# Patient Record
Sex: MALE | Race: WHITE
[De-identification: names, ages, dates, MRNs, and addresses within clinical notes are randomized per-mention and may not be internally consistent; named-entity substitution may affect disease eponyms.]

---

## 2017-05-12 PROBLEM — Z00.00 ENCOUNTER FOR PREVENTIVE HEALTH EXAMINATION: Status: ACTIVE | Noted: 2017-05-12

## 2017-06-19 PROBLEM — Z85.51 HISTORY OF CARCINOMA OF BLADDER: Status: RESOLVED | Noted: 2017-06-19 | Resolved: 2017-06-19

## 2017-06-19 PROBLEM — C68.9 UROTHELIAL CANCER: Status: RESOLVED | Noted: 2017-06-19 | Resolved: 2017-06-19

## 2017-06-19 PROBLEM — I10 HTN (HYPERTENSION), BENIGN: Status: RESOLVED | Noted: 2017-06-19 | Resolved: 2017-06-19

## 2017-06-19 PROBLEM — I25.10 CAD (CORONARY ARTERY DISEASE): Status: RESOLVED | Noted: 2017-06-19 | Resolved: 2017-06-19

## 2017-06-19 PROBLEM — Z87.891 FORMER SMOKER: Status: ACTIVE | Noted: 2017-06-19

## 2017-06-19 PROBLEM — Z87.09 HISTORY OF PULMONARY EMPHYSEMA: Status: RESOLVED | Noted: 2017-06-19 | Resolved: 2017-06-19

## 2017-06-19 RX ORDER — APIXABAN 2.5 MG/1
2.5 TABLET, FILM COATED ORAL
Refills: 0 | Status: ACTIVE | COMMUNITY

## 2017-06-19 RX ORDER — FLUTICASONE PROPIONATE AND SALMETEROL 50; 250 UG/1; UG/1
250-50 POWDER RESPIRATORY (INHALATION)
Refills: 0 | Status: ACTIVE | COMMUNITY

## 2017-06-19 RX ORDER — ATORVASTATIN CALCIUM 40 MG/1
40 TABLET, FILM COATED ORAL
Refills: 0 | Status: ACTIVE | COMMUNITY

## 2017-06-19 RX ORDER — TIOTROPIUM BROMIDE 18 UG/1
18 CAPSULE ORAL; RESPIRATORY (INHALATION)
Refills: 0 | Status: ACTIVE | COMMUNITY

## 2017-06-20 ENCOUNTER — APPOINTMENT (OUTPATIENT)
Dept: THORACIC SURGERY | Facility: CLINIC | Age: 82
End: 2017-06-20

## 2017-06-20 VITALS
HEIGHT: 64 IN | DIASTOLIC BLOOD PRESSURE: 65 MMHG | BODY MASS INDEX: 24.92 KG/M2 | TEMPERATURE: 97.5 F | HEART RATE: 83 BPM | WEIGHT: 146 LBS | OXYGEN SATURATION: 98 % | RESPIRATION RATE: 17 BRPM | SYSTOLIC BLOOD PRESSURE: 109 MMHG

## 2017-06-20 DIAGNOSIS — Z87.891 PERSONAL HISTORY OF NICOTINE DEPENDENCE: ICD-10-CM

## 2017-06-20 DIAGNOSIS — I25.10 ATHEROSCLEROTIC HEART DISEASE OF NATIVE CORONARY ARTERY W/OUT ANGINA PECTORIS: ICD-10-CM

## 2017-06-20 DIAGNOSIS — Z87.09 PERSONAL HISTORY OF OTHER DISEASES OF THE RESPIRATORY SYSTEM: ICD-10-CM

## 2017-06-20 DIAGNOSIS — Z85.51 PERSONAL HISTORY OF MALIGNANT NEOPLASM OF BLADDER: ICD-10-CM

## 2017-06-20 DIAGNOSIS — I10 ESSENTIAL (PRIMARY) HYPERTENSION: ICD-10-CM

## 2017-06-20 DIAGNOSIS — Z78.9 OTHER SPECIFIED HEALTH STATUS: ICD-10-CM

## 2017-06-20 DIAGNOSIS — C68.9 MALIGNANT NEOPLASM OF URINARY ORGAN, UNSPECIFIED: ICD-10-CM

## 2017-06-20 RX ORDER — RAMIPRIL 2.5 MG/1
2.5 CAPSULE ORAL
Refills: 0 | Status: DISCONTINUED | COMMUNITY
End: 2017-06-20

## 2017-06-20 RX ORDER — CLOPIDOGREL 75 MG/1
TABLET, FILM COATED ORAL
Refills: 0 | Status: DISCONTINUED | COMMUNITY
End: 2017-06-20

## 2017-06-20 RX ORDER — MULTIVITAMIN
TABLET ORAL
Refills: 0 | Status: ACTIVE | COMMUNITY

## 2017-06-20 RX ORDER — ASPIRIN 81 MG
81 TABLET, DELAYED RELEASE (ENTERIC COATED) ORAL
Refills: 0 | Status: ACTIVE | COMMUNITY

## 2017-12-19 ENCOUNTER — APPOINTMENT (OUTPATIENT)
Dept: THORACIC SURGERY | Facility: CLINIC | Age: 82
End: 2017-12-19
Payer: MEDICARE

## 2017-12-19 VITALS
BODY MASS INDEX: 25.23 KG/M2 | OXYGEN SATURATION: 96 % | WEIGHT: 147 LBS | HEART RATE: 74 BPM | TEMPERATURE: 98.4 F | DIASTOLIC BLOOD PRESSURE: 68 MMHG | RESPIRATION RATE: 18 BRPM | SYSTOLIC BLOOD PRESSURE: 127 MMHG

## 2017-12-19 PROCEDURE — 99215 OFFICE O/P EST HI 40 MIN: CPT

## 2017-12-20 RX ORDER — ACYCLOVIR 800 MG/1
TABLET ORAL
Refills: 0 | Status: COMPLETED | COMMUNITY
End: 2017-12-20

## 2017-12-20 RX ORDER — FINASTERIDE 5 MG/1
5 TABLET, FILM COATED ORAL
Refills: 0 | Status: COMPLETED | COMMUNITY
End: 2017-12-20

## 2017-12-20 RX ORDER — MUPIROCIN 20 MG/G
2 OINTMENT TOPICAL
Refills: 0 | Status: COMPLETED | COMMUNITY
End: 2017-12-20

## 2018-07-28 PROBLEM — Z78.9 ALCOHOL USE: Status: ACTIVE | Noted: 2017-06-19

## 2018-12-17 ENCOUNTER — OTHER (OUTPATIENT)
Age: 83
End: 2018-12-17

## 2018-12-18 ENCOUNTER — APPOINTMENT (OUTPATIENT)
Dept: THORACIC SURGERY | Facility: CLINIC | Age: 83
End: 2018-12-18
Payer: MEDICARE

## 2019-01-15 ENCOUNTER — APPOINTMENT (OUTPATIENT)
Dept: THORACIC SURGERY | Facility: CLINIC | Age: 84
End: 2019-01-15
Payer: MEDICARE

## 2019-01-15 VITALS
BODY MASS INDEX: 25.23 KG/M2 | TEMPERATURE: 97.4 F | DIASTOLIC BLOOD PRESSURE: 59 MMHG | WEIGHT: 147 LBS | OXYGEN SATURATION: 99 % | SYSTOLIC BLOOD PRESSURE: 102 MMHG | RESPIRATION RATE: 17 BRPM | HEART RATE: 82 BPM

## 2019-01-15 PROCEDURE — 99213 OFFICE O/P EST LOW 20 MIN: CPT

## 2019-01-15 RX ORDER — SITAGLIPTIN 50 MG/1
50 TABLET, FILM COATED ORAL
Refills: 0 | Status: ACTIVE | COMMUNITY

## 2019-01-15 RX ORDER — OMEPRAZOLE 20 MG/1
20 CAPSULE, DELAYED RELEASE ORAL
Refills: 0 | Status: ACTIVE | COMMUNITY

## 2019-01-15 RX ORDER — LEVALBUTEROL TARTRATE 45 UG/1
45 AEROSOL, METERED ORAL
Refills: 0 | Status: ACTIVE | COMMUNITY

## 2019-03-26 ENCOUNTER — APPOINTMENT (OUTPATIENT)
Dept: THORACIC SURGERY | Facility: CLINIC | Age: 84
End: 2019-03-26
Payer: MEDICARE

## 2019-03-26 VITALS
HEART RATE: 82 BPM | WEIGHT: 145 LBS | RESPIRATION RATE: 18 BRPM | BODY MASS INDEX: 24.89 KG/M2 | OXYGEN SATURATION: 97 % | DIASTOLIC BLOOD PRESSURE: 65 MMHG | TEMPERATURE: 98 F | SYSTOLIC BLOOD PRESSURE: 133 MMHG

## 2019-03-26 PROCEDURE — 99214 OFFICE O/P EST MOD 30 MIN: CPT

## 2019-03-28 NOTE — CONSULT LETTER
[Dear  ___] : Dear  [unfilled], [Consult Letter:] : I had the pleasure of evaluating your patient, [unfilled]. [( Thank you for referring [unfilled] for consultation for _____ )] : Thank you for referring [unfilled] for consultation for [unfilled] [Please see my note below.] : Please see my note below. [Consult Closing:] : Thank you very much for allowing me to participate in the care of this patient.  If you have any questions, please do not hesitate to contact me. [Sincerely,] : Sincerely, [FreeTextEntry2] : Lissette Guzman MD (Pul)  [FreeTextEntry3] : Demetrio Okeefe MD, MPH \par System Director of Thoracic Surgery \par Director of Comprehensive Lung and Foregut [place] \par Professor Cardiovascular & Thoracic Surgery  \par [facility] [employer] at [facility]\par

## 2019-03-28 NOTE — PHYSICAL EXAM
[Auscultation Breath Sounds / Voice Sounds] : lungs were clear to auscultation bilaterally [Heart Rate And Rhythm] : heart rate was normal and rhythm regular [Heart Sounds] : normal S1 and S2 [Heart Sounds Gallop] : no gallops [Murmurs] : no murmurs [Heart Sounds Pericardial Friction Rub] : no pericardial rub [Examination Of The Chest] : the chest was normal in appearance [Chest Visual Inspection Thoracic Asymmetry] : no chest asymmetry [Diminished Respiratory Excursion] : normal chest expansion [Bowel Sounds] : normal bowel sounds [Abdomen Soft] : soft [Abdomen Tenderness] : non-tender [Abdomen Mass (___ Cm)] : no abdominal mass palpated [Abnormal Walk] : normal gait [Nail Clubbing] : no clubbing  or cyanosis of the fingernails [Musculoskeletal - Swelling] : no joint swelling seen [Motor Tone] : muscle strength and tone were normal [Skin Color & Pigmentation] : normal skin color and pigmentation [Skin Turgor] : normal skin turgor [] : no rash [Deep Tendon Reflexes (DTR)] : deep tendon reflexes were 2+ and symmetric [Sensation] : the sensory exam was normal to light touch and pinprick [No Focal Deficits] : no focal deficits [Oriented To Time, Place, And Person] : oriented to person, place, and time [Impaired Insight] : insight and judgment were intact [Affect] : the affect was normal

## 2019-03-28 NOTE — ASSESSMENT
[FreeTextEntry1] : 86 y/o M, former smoker, w/ hx of HTN, CAD s/p CABG (2007), COPD/emphysema, partial gastrectomy 1991, bladder CA s/p rxn, Lung CA, and renal cell cancer.\par \par Now ~10yr 9mo s/p Lt VATS LLLobectomy on 6/3/08. 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 \par Also ~10.5 yrs s/p Lt VATS pleurodesis on 8/6/08.\par \par CT Chest on 3/11/19:\par - slightly increasing 8 mm NETTIE solid nodule (series 4 image 27, previously 7 mm), unclear if mucoid impaction or new small primary lung cancer\par - post-op changes\par \par I have reviewed the patient's medical records and diagnostic images at time of this office consultation and have made the following recommendation:\par 1. CT scan reviewed and explained to patient and his daughter, also discussed w/ Dr. MARIN over the phone, the increasing size NETTIE nodule is more nodular but more likely benign, I recommended a close f/u with CT Chest w/out contrast in 3 months\par 2. Patient will also f/u with Dr. MARIN on 3/28/19\par \par \par Written by Vanessa Ortiz NP, acting as a scribe for Dr. Demetrio Okeefe.\par \par The documentation recorded by the scribe accurately reflects the service I personally performed and the decisions made by me. DEMETRIO OKEEFE MD\par

## 2019-03-28 NOTE — HISTORY OF PRESENT ILLNESS
[FreeTextEntry1] : 86 y/o M, former smoker, w/ hx of HTN, CAD s/p CABG (2007), COPD/emphysema, partial gastrectomy 1991, bladder CA s/p rxn, Lung CA, and renal cell cancer.\par \par Now ~10yr 9mo s/p Lt VATS LLLobectomy on 6/3/08. 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 \par Also ~10.5 yrs s/p Lt VATS pleurodesis on 8/6/08.\par \par Pt developed Rt renal CA, s/p Nephrouretectomy on 4/28/16. Path revealed high-grade invasive urothelial CA, 4.5 cm arising form renal pelvics and focally invading into peripelvic fat and into the renal parenchyma.\par \par CT Chest on 12/20/18:\par - minimally enlarging 7 mm NETTIE nodule, previously 2 - 3mm (image 21 series 4).\par - dense calcifications in the Lt hemithorax\par - peripheral reticulations are seen in the lower lobes bilaterally Lt > Rt\par - cardiomegaly with moderate atherosclerotic disease\par \par CT Chest on 3/11/19:\par - slightly increasing 8 mm NETTIE solid nodule (series 4 image 27, previously 7 mm), unclear if mucoid impaction or new small primary lung cancer\par - post-op changes\par \par Pt is here today for follow up. Admits to SOB on exertion, productive cough w/ small amount of whitish sputum, had a flu/cold 3 months ago.

## 2019-06-25 ENCOUNTER — APPOINTMENT (OUTPATIENT)
Dept: THORACIC SURGERY | Facility: CLINIC | Age: 84
End: 2019-06-25
Payer: MEDICARE

## 2019-06-25 VITALS
OXYGEN SATURATION: 96 % | SYSTOLIC BLOOD PRESSURE: 125 MMHG | HEART RATE: 76 BPM | TEMPERATURE: 97.5 F | DIASTOLIC BLOOD PRESSURE: 74 MMHG | RESPIRATION RATE: 18 BRPM | WEIGHT: 138 LBS | BODY MASS INDEX: 23.69 KG/M2

## 2019-06-25 DIAGNOSIS — J18.9 PNEUMONIA, UNSPECIFIED ORGANISM: ICD-10-CM

## 2019-06-25 PROCEDURE — 99213 OFFICE O/P EST LOW 20 MIN: CPT

## 2019-06-26 RX ORDER — ISOPROPYL ALCOHOL 70 ML/100ML
70 SWAB TOPICAL
Qty: 50 | Refills: 0 | Status: ACTIVE | COMMUNITY
Start: 2019-04-20

## 2019-06-26 RX ORDER — AMOXICILLIN AND CLAVULANATE POTASSIUM 500; 125 MG/1; MG/1
500-125 TABLET, FILM COATED ORAL
Qty: 14 | Refills: 0 | Status: COMPLETED | COMMUNITY
Start: 2019-01-15 | End: 2019-06-26

## 2019-06-26 RX ORDER — METOPROLOL SUCCINATE 50 MG/1
50 TABLET, EXTENDED RELEASE ORAL
Refills: 0 | Status: COMPLETED | COMMUNITY
End: 2019-06-26

## 2019-06-26 RX ORDER — DOXEPIN HYDROCHLORIDE 50 MG/1
50 CAPSULE ORAL
Qty: 30 | Refills: 0 | Status: ACTIVE | COMMUNITY
Start: 2019-06-20

## 2019-06-26 RX ORDER — DOXEPIN HYDROCHLORIDE 10 MG/1
10 CAPSULE ORAL
Refills: 0 | Status: COMPLETED | COMMUNITY
End: 2019-06-26

## 2019-06-26 RX ORDER — RANITIDINE HYDROCHLORIDE 150 MG/1
150 CAPSULE ORAL
Refills: 0 | Status: COMPLETED | COMMUNITY
End: 2019-06-26

## 2019-06-26 RX ORDER — METOPROLOL SUCCINATE 100 MG/1
100 TABLET, EXTENDED RELEASE ORAL
Qty: 30 | Refills: 0 | Status: ACTIVE | COMMUNITY
Start: 2019-05-20

## 2019-06-26 RX ORDER — DIGOXIN 125 UG/1
125 TABLET ORAL
Qty: 30 | Refills: 0 | Status: ACTIVE | COMMUNITY
Start: 2019-06-04

## 2019-06-26 RX ORDER — LOSARTAN POTASSIUM AND HYDROCHLOROTHIAZIDE 12.5; 5 MG/1; MG/1
50-12.5 TABLET ORAL
Qty: 30 | Refills: 0 | Status: ACTIVE | COMMUNITY
Start: 2019-05-06

## 2019-06-26 RX ORDER — LOSARTAN POTASSIUM 25 MG/1
25 TABLET, FILM COATED ORAL
Refills: 0 | Status: COMPLETED | COMMUNITY
End: 2019-06-26

## 2019-06-26 RX ORDER — TAMSULOSIN HYDROCHLORIDE 0.4 MG/1
0.4 CAPSULE ORAL
Qty: 30 | Refills: 0 | Status: ACTIVE | COMMUNITY
Start: 2019-05-20

## 2019-06-26 NOTE — REVIEW OF SYSTEMS
[As Noted in HPI] : as noted in HPI [Cough] : cough [Negative] : Heme/Lymph [Wheezing] : no wheezing [SOB on Exertion] : no shortness of breath during exertion

## 2019-06-26 NOTE — HISTORY OF PRESENT ILLNESS
[FreeTextEntry1] : 86 y/o M, former smoker, w/ hx of HTN, CAD s/p CABG (2007), COPD/emphysema, partial gastrectomy 1991, bladder CA s/p rxn, Lung CA, and renal cell cancer.\par \par Now 11yr s/p Lt VATS LLLobectomy on 6/3/08. 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 \par Also ~11 yrs s/p Lt VATS pleurodesis on 8/6/08.\par \par Pt developed Rt renal CA, s/p Nephrouretectomy on 4/28/16. Path revealed high-grade invasive urothelial CA, 4.5 cm arising form renal pelvics and focally invading into peripelvic fat and into the renal parenchyma.\par \par CT Chest on 12/20/18:\par - minimally enlarging 7 mm NETTIE nodule, previously 2 - 3mm (image 21 series 4).\par - dense calcifications in the Lt hemithorax\par - peripheral reticulations are seen in the lower lobes bilaterally Lt > Rt\par - cardiomegaly with moderate atherosclerotic disease\par \par CT Chest on 3/11/19:\par - slightly increasing 8 mm NETTIE solid nodule (series 4 image 27, previously 7 mm), unclear if mucoid impaction or new small primary lung cancer\par - post-op changes\par \par CT Chest on 6/17/19:\par - 8 mm irregular nodule NETTIE\par - stable 4 mm linear area of nodularity RUL\par - calcified granulomas RML\par - post-op changes\par - pulmonary fibrosis\par \par Patient is here today for a follow up. Pt admits to cough with light mucus, denies SOB or CP.

## 2019-06-26 NOTE — DATA REVIEWED
[FreeTextEntry1] : CT Chest on 6/17/19:\par - 8 mm irregular nodule NETTIE\par - stable 4 mm linear area of nodularity RUL\par - calcified granulomas RML\par - post-op changes\par - pulmonary fibrosis

## 2019-06-26 NOTE — CONSULT LETTER
[Dear  ___] : Dear  [unfilled], [Consult Letter:] : I had the pleasure of evaluating your patient, [unfilled]. [( Thank you for referring [unfilled] for consultation for _____ )] : Thank you for referring [unfilled] for consultation for [unfilled] [Please see my note below.] : Please see my note below. [Consult Closing:] : Thank you very much for allowing me to participate in the care of this patient.  If you have any questions, please do not hesitate to contact me. [Sincerely,] : Sincerely, [FreeTextEntry2] : Lissette Guzman MD (Pul)  [FreeTextEntry3] : Demetrio Okeefe MD, MPH \par System Director of Thoracic Surgery \par Director of Comprehensive Lung and Foregut [place] \par Professor Cardiovascular & Thoracic Surgery  \par [facility] [employer] [facility] at [facility]\par

## 2019-06-26 NOTE — ASSESSMENT
[FreeTextEntry1] : 84 y/o M, former smoker, w/ hx of HTN, CAD s/p CABG (2007), COPD/emphysema, partial gastrectomy 1991, bladder CA s/p rxn, Lung CA, and renal cell cancer.\par \par Now 11yr s/p Lt VATS LLLobectomy on 6/3/08. 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 \par Also ~11 yrs s/p Lt VATS pleurodesis on 8/6/08.\par \par Pt developed Rt renal CA, s/p Nephrouretectomy on 4/28/16. Path revealed high-grade invasive urothelial CA, 4.5 cm arising form renal pelvics and focally invading into peripelvic fat and into the renal parenchyma.\par \par CT Chest on 6/17/19:\par - 8 mm irregular nodule NETTIE\par - stable 4 mm linear area of nodularity RUL\par - calcified granulomas RML\par - post-op changes\par - pulmonary fibrosis\par \par I have reviewed the patient's medical records and diagnostic images at time of this office consultation and have made the following recommendation:\par 1. CT reviewed with pt and family. NETTIE nodule is interval stable since last visit. Considering pt's age and overall condition, I would like to continue closely monitoring this nodule. If this nodule increases in size, will consider needle biopsy.  \par 2. RTC in 6 mons with CT Chest w/o contrast.\par \par \par Written by Juana Dinero NP, acting as a scribe for Dr. Demetrio Peace. \par \par The documentation recorded by the scribe accurately reflects the service I personally performed and the decisions made by me. DEMETRIO PEACE MD\par

## 2019-12-19 ENCOUNTER — APPOINTMENT (OUTPATIENT)
Dept: THORACIC SURGERY | Facility: CLINIC | Age: 84
End: 2019-12-19

## 2019-12-19 ENCOUNTER — APPOINTMENT (OUTPATIENT)
Dept: THORACIC SURGERY | Facility: CLINIC | Age: 84
End: 2019-12-19
Payer: MEDICARE

## 2019-12-19 VITALS
SYSTOLIC BLOOD PRESSURE: 147 MMHG | TEMPERATURE: 97.9 F | DIASTOLIC BLOOD PRESSURE: 70 MMHG | BODY MASS INDEX: 24.03 KG/M2 | OXYGEN SATURATION: 96 % | HEART RATE: 74 BPM | RESPIRATION RATE: 18 BRPM | WEIGHT: 140 LBS

## 2019-12-19 DIAGNOSIS — C34.90 MALIGNANT NEOPLASM OF UNSPECIFIED PART OF UNSPECIFIED BRONCHUS OR LUNG: ICD-10-CM

## 2019-12-19 PROCEDURE — 99213 OFFICE O/P EST LOW 20 MIN: CPT

## 2019-12-20 PROBLEM — C34.90 LUNG CANCER: Status: ACTIVE | Noted: 2017-06-19

## 2019-12-20 NOTE — PHYSICAL EXAM
[Auscultation Breath Sounds / Voice Sounds] : lungs were clear to auscultation bilaterally [Heart Rate And Rhythm] : heart rate was normal and rhythm regular [Heart Sounds] : normal S1 and S2 [Murmurs] : no murmurs [Heart Sounds Gallop] : no gallops [Heart Sounds Pericardial Friction Rub] : no pericardial rub [Examination Of The Chest] : the chest was normal in appearance [Diminished Respiratory Excursion] : normal chest expansion [Chest Visual Inspection Thoracic Asymmetry] : no chest asymmetry [Bowel Sounds] : normal bowel sounds [Abdomen Soft] : soft [Abdomen Tenderness] : non-tender [Abdomen Mass (___ Cm)] : no abdominal mass palpated [Abnormal Walk] : normal gait [Musculoskeletal - Swelling] : no joint swelling seen [Motor Tone] : muscle strength and tone were normal [Nail Clubbing] : no clubbing  or cyanosis of the fingernails [Skin Color & Pigmentation] : normal skin color and pigmentation [Skin Turgor] : normal skin turgor [Deep Tendon Reflexes (DTR)] : deep tendon reflexes were 2+ and symmetric [] : no rash [Sensation] : the sensory exam was normal to light touch and pinprick [No Focal Deficits] : no focal deficits [Oriented To Time, Place, And Person] : oriented to person, place, and time [Impaired Insight] : insight and judgment were intact [Affect] : the affect was normal

## 2019-12-23 NOTE — ASSESSMENT
[FreeTextEntry1] : 85 y/o M, former smoker, w/ hx of HTN, CAD s/p CABG (2007), COPD/emphysema, partial gastrectomy 1991, bladder CA s/p rxn, Lung CA, and renal cell cancer.\par \par Now 11.5 yr s/p Lt VATS LLLobectomy on 6/3/08. 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 \par Also ~11 yrs 4 mo s/p Lt VATS pleurodesis on 8/6/08.\par \par Pt developed Rt renal CA, s/p Nephrouretectomy on 4/28/16. Path revealed high-grade invasive urothelial CA, 4.5 cm arising form renal pelvics and focally invading into peripelvic fat and into the renal parenchyma.\par \par CT Chest on 6/17/19:\par - 8 mm irregular nodule NETTIE\par - stable 4 mm linear area of nodularity RUL\par - calcified granulomas RML\par - post-op changes\par - pulmonary fibrosis\par \par CT Chest on 12/09/2019:\par - post-op changes\par - enlarged irregular 1.8 x 1.5 cm NETTIE nodule previously 8 mm (4:29)\par - multiple calcified pulmonary nodules are noted. Annotated on series 4. \par - left-side pleural thickening with extra pleural fat expansion\par - stable 1.0 x 0.8 cm noncalcified LN in the AP window \par \par I have reviewed the patient's medical records and diagnostic images at time of this office consultation and have made the following recommendation:\par 1. CT reviewed with pt and family. NETTIE has interval increase in size, I recommended FNA of NETTIE nodule IR.  If FNA is positive for malignancy, may consider SBRT. \par 2. PFTs\par 3. PET/CT\par \par \par I personally performed the services described in the documentation, reviewed the documentation recorded by the scribe in my presence and it accurately and completely records my words and actions. \par \par I, Juana Dinero NP, am scribing for and the presence of Dr. Demetrio Okeefe the following sections, HISTORY OF PRESENT ILLNESS, PAST MEDICAL/FAMILY/SOCIAL HISTORY; REVIEW OF SYSTEMS; VITAL SIGNS; PHYSICAL EXAM; DISPOSITION.\par

## 2019-12-23 NOTE — HISTORY OF PRESENT ILLNESS
[FreeTextEntry1] : 87 y/o M, former smoker, w/ hx of HTN, CAD s/p CABG (2007), COPD/emphysema, partial gastrectomy 1991, bladder CA s/p rxn, Lung CA, and renal cell cancer.\par \par Now 11.5 yr s/p Lt VATS LLLobectomy on 6/3/08. 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 \par Also ~11 yrs 4 mo s/p Lt VATS pleurodesis on 8/6/08.\par \par Pt developed Rt renal CA, s/p Nephrouretectomy on 4/28/16. Path revealed high-grade invasive urothelial CA, 4.5 cm arising form renal pelvics and focally invading into peripelvic fat and into the renal parenchyma.\par \par CT Chest on 12/20/18:\par - minimally enlarging 7 mm NETTIE nodule, previously 2 - 3mm (image 21 series 4).\par - dense calcifications in the Lt hemithorax\par - peripheral reticulations are seen in the lower lobes bilaterally Lt > Rt\par - cardiomegaly with moderate atherosclerotic disease\par \par CT Chest on 3/11/19:\par - slightly increasing 8 mm NETTIE solid nodule (series 4 image 27, previously 7 mm), unclear if mucoid impaction or new small primary lung cancer\par - post-op changes\par \par CT Chest on 6/17/19:\par - 8 mm irregular nodule NETTIE\par - stable 4 mm linear area of nodularity RUL\par - calcified granulomas RML\par - post-op changes\par - pulmonary fibrosis\par \par CT Chest on 12/09/2019:\par - post-op changes\par - enlarged irregular 1.8 x 1.5 cm NETTIE nodule previously 8 mm (4:29)\par - multiple calcified pulmonary nodules are noted. Annotated on series 4. \par - left-side pleural thickening with extra pleural fat expansion\par - stable 1.0 x 0.8 cm noncalcified LN in the AP window \par \par Patient is here today for a follow up. Pt reports doing well, denies SOB, cough or CP.\par \par

## 2019-12-23 NOTE — DATA REVIEWED
[FreeTextEntry1] : CT Chest on 12/09/2019:\par - post-op changes\par - enlarged irregular 1.8 x 1.5 cm NETTIE nodule previously 8 mm (4:29)\par - multiple calcified pulmonary nodules are noted. Annotated on series 4. \par - left-side pleural thickening with extra pleural fat expansion\par - stable 1.0 x 0.8 cm noncalcified LN in the AP window

## 2019-12-23 NOTE — CONSULT LETTER
[Dear  ___] : Dear  [unfilled], [( Thank you for referring [unfilled] for consultation for _____ )] : Thank you for referring [unfilled] for consultation for [unfilled] [Consult Letter:] : I had the pleasure of evaluating your patient, [unfilled]. [Consult Closing:] : Thank you very much for allowing me to participate in the care of this patient.  If you have any questions, please do not hesitate to contact me. [Please see my note below.] : Please see my note below. [Sincerely,] : Sincerely, [FreeTextEntry2] : Lissette Guzman MD (Pulm) [FreeTextEntry3] : Demetrio Okeefe MD, MPH \par System Director of Thoracic Surgery \par Director of Comprehensive Lung and Foregut [place] \par Professor Cardiovascular & Thoracic Surgery  \par [facility] [employer] [facility]\par

## 2019-12-30 ENCOUNTER — APPOINTMENT (OUTPATIENT)
Dept: INTERVENTIONAL RADIOLOGY/VASCULAR | Facility: CLINIC | Age: 84
End: 2019-12-30
Payer: MEDICARE

## 2019-12-30 VITALS
BODY MASS INDEX: 24.92 KG/M2 | SYSTOLIC BLOOD PRESSURE: 128 MMHG | WEIGHT: 146 LBS | RESPIRATION RATE: 16 BRPM | HEIGHT: 64 IN | HEART RATE: 67 BPM | DIASTOLIC BLOOD PRESSURE: 69 MMHG | OXYGEN SATURATION: 97 %

## 2019-12-30 DIAGNOSIS — R91.1 SOLITARY PULMONARY NODULE: ICD-10-CM

## 2019-12-30 PROCEDURE — 99204 OFFICE O/P NEW MOD 45 MIN: CPT

## 2019-12-30 RX ORDER — FERROUS SULFATE 325(65) MG
325 (65 FE) TABLET ORAL
Refills: 0 | Status: DISCONTINUED | COMMUNITY
End: 2019-12-30

## 2019-12-30 RX ORDER — POLYVINYL ALCOHOL 14 MG/ML
1.4 SOLUTION/ DROPS OPHTHALMIC
Refills: 0 | Status: DISCONTINUED | COMMUNITY
End: 2019-12-30

## 2019-12-30 RX ORDER — FINASTERIDE 5 MG/1
5 TABLET, FILM COATED ORAL
Refills: 0 | Status: ACTIVE | COMMUNITY

## 2019-12-30 RX ORDER — DICLOFENAC SODIUM 10 MG/G
1 GEL TOPICAL
Qty: 100 | Refills: 0 | Status: DISCONTINUED | COMMUNITY
Start: 2019-05-20 | End: 2019-12-30

## 2019-12-30 RX ORDER — GABAPENTIN 100 MG/1
100 CAPSULE ORAL
Qty: 90 | Refills: 0 | Status: DISCONTINUED | COMMUNITY
Start: 2019-05-20 | End: 2019-12-30

## 2019-12-30 RX ORDER — RANITIDINE 150 MG/1
150 TABLET ORAL
Qty: 30 | Refills: 0 | Status: DISCONTINUED | COMMUNITY
Start: 2019-06-20 | End: 2019-12-30

## 2019-12-30 RX ORDER — FAMOTIDINE 20 MG/1
20 TABLET, FILM COATED ORAL
Refills: 0 | Status: ACTIVE | COMMUNITY

## 2019-12-30 RX ORDER — GLIPIZIDE 5 MG/1
5 TABLET ORAL
Refills: 0 | Status: DISCONTINUED | COMMUNITY
End: 2019-12-30

## 2019-12-30 RX ORDER — DEXLANSOPRAZOLE 30 MG/1
30 CAPSULE, DELAYED RELEASE ORAL
Refills: 0 | Status: DISCONTINUED | COMMUNITY
End: 2019-12-30

## 2020-01-02 LAB
ANION GAP SERPL CALC-SCNC: 12 MMOL/L
APTT BLD: 38 SEC
BASOPHILS # BLD AUTO: 0.03 K/UL
BASOPHILS NFR BLD AUTO: 0.5 %
BUN SERPL-MCNC: 20 MG/DL
CALCIUM SERPL-MCNC: 9.6 MG/DL
CHLORIDE SERPL-SCNC: 108 MMOL/L
CO2 SERPL-SCNC: 26 MMOL/L
CREAT SERPL-MCNC: 1.35 MG/DL
EOSINOPHIL # BLD AUTO: 0.13 K/UL
EOSINOPHIL NFR BLD AUTO: 2 %
GLUCOSE SERPL-MCNC: 114 MG/DL
HCT VFR BLD CALC: 39.5 %
HGB BLD-MCNC: 12 G/DL
IMM GRANULOCYTES NFR BLD AUTO: 0.6 %
INR PPP: 1.07 RATIO
LYMPHOCYTES # BLD AUTO: 1.09 K/UL
LYMPHOCYTES NFR BLD AUTO: 16.8 %
MAN DIFF?: NORMAL
MCHC RBC-ENTMCNC: 30.4 GM/DL
MCHC RBC-ENTMCNC: 31.1 PG
MCV RBC AUTO: 102.3 FL
MONOCYTES # BLD AUTO: 0.46 K/UL
MONOCYTES NFR BLD AUTO: 7.1 %
NEUTROPHILS # BLD AUTO: 4.74 K/UL
NEUTROPHILS NFR BLD AUTO: 73 %
PLATELET # BLD AUTO: 167 K/UL
POTASSIUM SERPL-SCNC: 5.3 MMOL/L
PT BLD: 12.1 SEC
RBC # BLD: 3.86 M/UL
RBC # FLD: 15.1 %
SODIUM SERPL-SCNC: 146 MMOL/L
WBC # FLD AUTO: 6.49 K/UL

## 2020-01-14 ENCOUNTER — FORM ENCOUNTER (OUTPATIENT)
Age: 85
End: 2020-01-14

## 2020-01-15 ENCOUNTER — RESULT REVIEW (OUTPATIENT)
Age: 85
End: 2020-01-15

## 2020-01-15 ENCOUNTER — OUTPATIENT (OUTPATIENT)
Dept: OUTPATIENT SERVICES | Facility: HOSPITAL | Age: 85
LOS: 1 days | End: 2020-01-15
Payer: MEDICARE

## 2020-01-15 DIAGNOSIS — R91.8 OTHER NONSPECIFIC ABNORMAL FINDING OF LUNG FIELD: ICD-10-CM

## 2020-01-15 DIAGNOSIS — C34.90 MALIGNANT NEOPLASM OF UNSPECIFIED PART OF UNSPECIFIED BRONCHUS OR LUNG: ICD-10-CM

## 2020-01-15 DIAGNOSIS — R91.1 SOLITARY PULMONARY NODULE: ICD-10-CM

## 2020-01-15 PROCEDURE — 88342 IMHCHEM/IMCYTCHM 1ST ANTB: CPT | Mod: 26

## 2020-01-15 PROCEDURE — 71045 X-RAY EXAM CHEST 1 VIEW: CPT | Mod: 26

## 2020-01-15 PROCEDURE — 88305 TISSUE EXAM BY PATHOLOGIST: CPT | Mod: 26

## 2020-01-15 PROCEDURE — 88173 CYTOPATH EVAL FNA REPORT: CPT | Mod: 26

## 2020-01-15 PROCEDURE — 88341 IMHCHEM/IMCYTCHM EA ADD ANTB: CPT | Mod: 26

## 2020-01-15 PROCEDURE — 32405: CPT

## 2020-01-15 PROCEDURE — 77012 CT SCAN FOR NEEDLE BIOPSY: CPT | Mod: 26

## 2020-01-23 LAB — NON-GYNECOLOGICAL CYTOLOGY STUDY: SIGNIFICANT CHANGE UP

## 2020-01-28 ENCOUNTER — MESSAGE (OUTPATIENT)
Age: 85
End: 2020-01-28